# Patient Record
Sex: MALE | Race: WHITE | NOT HISPANIC OR LATINO | URBAN - METROPOLITAN AREA
[De-identification: names, ages, dates, MRNs, and addresses within clinical notes are randomized per-mention and may not be internally consistent; named-entity substitution may affect disease eponyms.]

---

## 2017-09-08 ENCOUNTER — OFFICE VISIT - HEALTHEAST (OUTPATIENT)
Dept: INTERNAL MEDICINE | Facility: CLINIC | Age: 72
End: 2017-09-08

## 2017-09-08 DIAGNOSIS — N40.1 BENIGN PROSTATIC HYPERPLASIA WITH LOWER URINARY TRACT SYMPTOMS, UNSPECIFIED MORPHOLOGY: ICD-10-CM

## 2017-09-08 DIAGNOSIS — I10 ESSENTIAL HYPERTENSION WITH GOAL BLOOD PRESSURE LESS THAN 140/90: ICD-10-CM

## 2017-09-08 DIAGNOSIS — Z00.00 ROUTINE GENERAL MEDICAL EXAMINATION AT A HEALTH CARE FACILITY: ICD-10-CM

## 2017-09-08 LAB
CHOLEST SERPL-MCNC: 152 MG/DL
FASTING STATUS PATIENT QL REPORTED: NO
HDLC SERPL-MCNC: 48 MG/DL
LDLC SERPL CALC-MCNC: 94 MG/DL
PSA SERPL-MCNC: 3.8 NG/ML (ref 0–6.5)
TRIGL SERPL-MCNC: 48 MG/DL

## 2017-09-08 ASSESSMENT — MIFFLIN-ST. JEOR: SCORE: 1444.3

## 2017-09-11 ENCOUNTER — COMMUNICATION - HEALTHEAST (OUTPATIENT)
Dept: INTERNAL MEDICINE | Facility: CLINIC | Age: 72
End: 2017-09-11

## 2018-08-13 ENCOUNTER — OFFICE VISIT - HEALTHEAST (OUTPATIENT)
Dept: INTERNAL MEDICINE | Facility: CLINIC | Age: 73
End: 2018-08-13

## 2018-08-13 ENCOUNTER — COMMUNICATION - HEALTHEAST (OUTPATIENT)
Dept: INTERNAL MEDICINE | Facility: CLINIC | Age: 73
End: 2018-08-13

## 2018-08-13 DIAGNOSIS — E78.00 PURE HYPERCHOLESTEROLEMIA: ICD-10-CM

## 2018-08-13 DIAGNOSIS — I10 ESSENTIAL HYPERTENSION WITH GOAL BLOOD PRESSURE LESS THAN 140/90: ICD-10-CM

## 2018-08-13 DIAGNOSIS — Z00.00 PREVENTATIVE HEALTH CARE: ICD-10-CM

## 2018-08-13 LAB
ALBUMIN SERPL-MCNC: 4.1 G/DL (ref 3.5–5)
ALP SERPL-CCNC: 38 U/L (ref 45–120)
ALT SERPL W P-5'-P-CCNC: 23 U/L (ref 0–45)
ANION GAP SERPL CALCULATED.3IONS-SCNC: 13 MMOL/L (ref 5–18)
AST SERPL W P-5'-P-CCNC: 23 U/L (ref 0–40)
BILIRUB SERPL-MCNC: 0.6 MG/DL (ref 0–1)
BUN SERPL-MCNC: 19 MG/DL (ref 8–28)
CALCIUM SERPL-MCNC: 9.5 MG/DL (ref 8.5–10.5)
CHLORIDE BLD-SCNC: 104 MMOL/L (ref 98–107)
CHOLEST SERPL-MCNC: 156 MG/DL
CO2 SERPL-SCNC: 24 MMOL/L (ref 22–31)
CREAT SERPL-MCNC: 0.84 MG/DL (ref 0.7–1.3)
FASTING STATUS PATIENT QL REPORTED: NO
GFR SERPL CREATININE-BSD FRML MDRD: >60 ML/MIN/1.73M2
GLUCOSE BLD-MCNC: 101 MG/DL (ref 70–125)
HDLC SERPL-MCNC: 53 MG/DL
LDLC SERPL CALC-MCNC: 90 MG/DL
POTASSIUM BLD-SCNC: 4.7 MMOL/L (ref 3.5–5)
PROT SERPL-MCNC: 6.5 G/DL (ref 6–8)
PSA SERPL-MCNC: 3.1 NG/ML (ref 0–6.5)
SODIUM SERPL-SCNC: 141 MMOL/L (ref 136–145)
TRIGL SERPL-MCNC: 65 MG/DL

## 2018-08-13 RX ORDER — TAMSULOSIN HYDROCHLORIDE 0.4 MG/1
0.4 CAPSULE ORAL DAILY
Qty: 90 CAPSULE | Refills: 3 | Status: SHIPPED | OUTPATIENT
Start: 2018-08-13

## 2018-08-13 RX ORDER — LISINOPRIL 10 MG/1
10 TABLET ORAL DAILY
Qty: 90 TABLET | Refills: 3 | Status: SHIPPED | OUTPATIENT
Start: 2018-08-13

## 2018-08-13 RX ORDER — SIMVASTATIN 20 MG
20 TABLET ORAL AT BEDTIME
Qty: 90 TABLET | Refills: 3 | Status: SHIPPED | OUTPATIENT
Start: 2018-08-13

## 2021-05-27 ENCOUNTER — RECORDS - HEALTHEAST (OUTPATIENT)
Dept: ADMINISTRATIVE | Facility: CLINIC | Age: 76
End: 2021-05-27

## 2021-05-31 VITALS — HEIGHT: 70 IN | WEIGHT: 157.2 LBS | BODY MASS INDEX: 22.5 KG/M2

## 2021-06-01 VITALS — BODY MASS INDEX: 23.29 KG/M2 | WEIGHT: 162.3 LBS

## 2021-06-12 NOTE — PROGRESS NOTES
Assessment and Plan:   ASSESSMENT:  1. Routine general medical examination at a health care facility  Very healthy man.  Reviewed all health maintenance.  He wishes to refuse further influenza vaccines as he became ill last year  - PSA (Prostatic-Specific Antigen), Annual Screen  - Vitamin D, Total (25-Hydroxy)    2. Essential hypertension with goal blood pressure less than 140/90  Stable.  No cough  - Lipid Cascade  - Comprehensive Metabolic Panel  - lisinopril (PRINIVIL,ZESTRIL) 10 MG tablet; Take 1 tablet (10 mg total) by mouth daily.  Dispense: 90 tablet; Refill: 3    3. Benign prostatic hyperplasia with lower urinary tract symptoms, unspecified morphology  Stable  - tamsulosin (FLOMAX) 0.4 mg Cp24; Take 1 capsule (0.4 mg total) by mouth daily.  Dispense: 90 capsule; Refill: 3      4.  Hyperlipidemia.  Simvastatin added last year without side effect.  Recheck labs    PLAN:  Patient Instructions   Decrease colonoscopy to every 10 years--2013 to 2023    Refill meds    Update blood tests    No flu shot          Orders Placed This Encounter   Procedures     PSA (Prostatic-Specific Antigen), Annual Screen     Lipid Cascade     Order Specific Question:   Fasting is required?     Answer:   Unknown     Comprehensive Metabolic Panel     Vitamin D, Total (25-Hydroxy)     Medications Discontinued During This Encounter   Medication Reason     lisinopril (PRINIVIL,ZESTRIL) 10 MG tablet Reorder     simvastatin (ZOCOR) 20 MG tablet Reorder     tamsulosin (FLOMAX) 0.4 mg Cp24 Reorder       No Follow-up on file.    The patient's current medical problems were reviewed.      The following health maintenance schedule was reviewed with the patient and provided in printed form in the after visit summary:   Health Maintenance   Topic Date Due     FALL RISK ASSESSMENT  10/16/2016     TD 18+ HE  12/20/2016     INFLUENZA VACCINE RULE BASED (1) 08/01/2017     ADVANCE DIRECTIVES DISCUSSED WITH PATIENT  09/14/2021     COLONOSCOPY   10/17/2023     PNEUMOCOCCAL POLYSACCHARIDE VACCINE AGE 65 AND OVER  Completed     PNEUMOCOCCAL CONJUGATE VACCINE FOR ADULTS (PCV13 OR PREVNAR)  Completed     ZOSTER VACCINE  Completed        Subjective:   Chief Complaint:   Chief Complaint   Patient presents with     Medicare Annual Wellness Visit Subsequent       HPI:    Hubert Samayoa is an 71 y.o. male here for an Annual Wellness visit. He is a former Dr. Pope patient. He does not have any acute concerns today.     Health Maintenance: He does not want a flu shot today. He was given a five year clearance at his 2013 colonoscopy. It was normal, but his father had colon cancer. He has never had a polyp from what he can remember.     Review of Systems:    He feels well in general. He was started on 20 mg of simvastatin daily last year, but he has not had his cholesterol checked since then. He has not had any side effects from the medication. He denies cough from lisinopril. Tamsulosin works well for him. He inquires if he should take a nitrous oxide pill. He takes a daily baby aspirin. He takes fish oil, vitamin C, and a multivitamin. His back occasionally bothers him, but he does not have much pain otherwise. He does not have knee problems from being a runner. He had to wear a holter monitor once after noticing a palpitation, but nothing was found. He denies swelling in the lower extremities.   Please see above.  The rest of the review of systems are negative for all systems.    PFSH  He likes to play golf and softball. He walks a lot, hunts, and fishes. He used to run marathons. He has never had surgery other than a tonsillectomy as a kid. He does not smoke.     Patient Care Team:  Nirav Pope MD as PCP - General (Internal Medicine)     Patient Active Problem List   Diagnosis     Benign Prostatic Hypertrophy With Urinary Obstruction     Serology Prostate-specific Antigen (PSA) Elevated     Hyperlipidemia     Essential Hypertension     No past medical history  "on file.   No past surgical history on file.   Family History   Problem Relation Age of Onset     Heart failure Mother      Dementia Father      Alzheimer's      Social History     Social History     Marital status: Single     Spouse name: N/A     Number of children: N/A     Years of education: N/A     Occupational History     Not on file.     Social History Main Topics     Smoking status: Never Smoker     Smokeless tobacco: Not on file     Alcohol use Not on file     Drug use: Not on file     Sexual activity: Not on file     Other Topics Concern     Not on file     Social History Narrative      Current Outpatient Prescriptions   Medication Sig Dispense Refill     dorzolamide (TRUSOPT) 2 % ophthalmic solution Administer 1 drop to both eyes 2 times a day at 6:00 am and 4:00 pm.       lisinopril (PRINIVIL,ZESTRIL) 10 MG tablet Take 1 tablet (10 mg total) by mouth daily. 90 tablet 3     simvastatin (ZOCOR) 20 MG tablet Take 1 tablet (20 mg total) by mouth at bedtime. 90 tablet 3     tamsulosin (FLOMAX) 0.4 mg Cp24 Take 1 capsule (0.4 mg total) by mouth daily. 90 capsule 3     aspirin 81 MG EC tablet Take 1 tablet (81 mg total) by mouth daily.  0     No current facility-administered medications for this visit.       Objective:   Vital Signs:   Visit Vitals     /70 (Patient Site: Left Arm, Patient Position: Sitting, Cuff Size: Adult Regular)     Pulse 81     Ht 5' 10\" (1.778 m)     Wt 157 lb 3.2 oz (71.3 kg)     BMI 22.56 kg/m2        VisionScreening:  No exam data present     PHYSICAL EXAM  Constitutional:  Reveals an alert, pleasant, talkative man.  Vitals:  Per nursing notes.  Ears:  Clear.  Oropharynx:  Without posterior nasal drainage or thrush.  Neck:  Supple, thyroid not palpable.  Cardiac:  Regular rate and rhythm without murmurs, rubs, or gallops. Legs without edema. Palpation of the radial pulse regular.  Lungs: Clear.  Respiratory effort normal.  Abdomen:   Bowel sounds positive, nontender, nondistended. "  Neither liver nor spleen palpable.  Skin:   Without rash, bruise, or palpable lesions.  Rheumatologic: Normal joints and nails of the hands.  Neurologic:  Cranial nerves II-XII intact.     Psychiatric:  Mood appropriate, memory intact.     Assessment Results 9/8/2017   Activities of Daily Living No help needed   Instrumental Activities of Daily Living No help needed   Get Up and Go Score Less than 12 seconds   Mini Cog Total Score 4   Some recent data might be hidden     A Mini-Cog score of 0-2 suggests the possibility of dementia, score of 3-5 suggests no dementia    Identified Health Risks:     ADDITIONAL HISTORY SUMMARIZED (2): Reviewed 9/14/2017 Dr. Tristan note regarding general health and medications. Reviewed 10/17/2017 colonoscopy report regarding clearance.   DECISION TO OBTAIN EXTRA INFORMATION (1): None.   RADIOLOGY TESTS (1): None.  LABS (1): Labs from 9/14/2017 reviewed. Labs ordered.   MEDICINE TESTS (1): None.  INDEPENDENT REVIEW (2 each): None.     The visit lasted a total of 15 minutes face to face with the patient. Over 50% of the time was spent counseling and educating the patient about general health maintenance.    IFuentes, am scribing for and in the presence of, Dr. Hurtado.    IDr. Hurtado, personally performed the services described in this documentation, as scribed by Fuentes Sewell in my presence, and it is both accurate and complete.    Dragon dictation was used for this note.  Speech recognition errors are a possibility.    Total data points: 3

## 2021-06-19 NOTE — PROGRESS NOTES
Assessment and Plan:       1. Preventative health care  : Up-to-date.  Does not wish shingles shot or any future flu shots  - PSA (Prostatic-Specific Antigen), Annual Screen    2. Essential hypertension with goal blood pressure less than 140/90  Stable.  Refill meds.  Update labs  - lisinopril (PRINIVIL,ZESTRIL) 10 MG tablet; Take 1 tablet (10 mg total) by mouth daily.  Dispense: 90 tablet; Refill: 3  - Comprehensive Metabolic Panel    3. Pure hypercholesterolemia  Stable.  Refill meds.  Update labs  - Lipid Cascade  - Comprehensive Metabolic Panel        The patient's current medical problems were reviewed.      The following health maintenance schedule was reviewed with the patient and provided in printed form in the after visit summary:   Health Maintenance   Topic Date Due     TD 18+ HE  12/20/2016     INFLUENZA VACCINE RULE BASED (1) 08/01/2018     FALL RISK ASSESSMENT  08/13/2019     ADVANCE DIRECTIVES DISCUSSED WITH PATIENT  09/14/2021     COLONOSCOPY  10/17/2023     PNEUMOCOCCAL POLYSACCHARIDE VACCINE AGE 65 AND OVER  Completed     PNEUMOCOCCAL CONJUGATE VACCINE FOR ADULTS (PCV13 OR PREVNAR)  Completed     ZOSTER VACCINE  Completed        Subjective:   Chief Complaint: Hubert Samayoa is an 72 y.o. male here for an Annual Wellness visit.   HPI:      Hypertension: He continues on lisinopril 10mg daily. He denies cough. He monitors his blood pressure rarely and says it is always controlled. He denies foot swelling.     Hyperlipidemia: He continues on simvastatin 20mg daily. His most recent lipid panel showed a total cholesterol of 152 on 9/8/17. He takes a daily baby aspirin as well.     BPH: He continues on tamsulosin 0.4mg daily.     Health Maintenance: He declines a flu shot. He was advised on Shingrix. He is up to date on colonoscopy.     Review of Systems:    He walks 3 miles per day for exercise.   He says his sleep is mostly good. He takes melatonin.   He treats occasional heart burn with Zantac or  TUMS.   His hearing is good.   He denies bowel issues.   He denies arthritis aches; he has occasional back pain.   Please see above.  The rest of the review of systems are negative for all systems.    He has a girlfriend. He does not have any children. 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     Patient Care Team:  Adrian Hurtado MD as PCP - General (Internal Medicine)     Patient Active Problem List   Diagnosis     Benign Prostatic Hypertrophy With Urinary Obstruction     Serology Prostate-specific Antigen (PSA) Elevated     Pure hypercholesterolemia     Essential Hypertension     No past medical history on file.   No past surgical history on file.   Family History   Problem Relation Age of Onset     Heart failure Mother      Dementia Father      Alzheimer's      Social History     Social History     Marital status: Single     Spouse name: N/A     Number of children: N/A     Years of education: N/A     Occupational History     Not on file.     Social History Main Topics     Smoking status: Never Smoker     Smokeless tobacco: Never Used     Alcohol use Not on file     Drug use: Not on file     Sexual activity: Not on file     Other Topics Concern     Not on file     Social History Narrative      Current Outpatient Prescriptions   Medication Sig Dispense Refill     aspirin 81 MG EC tablet Take 1 tablet (81 mg total) by mouth daily.  0     dorzolamide (TRUSOPT) 2 % ophthalmic solution Administer 1 drop to both eyes 2 times a day at 6:00 am and 4:00 pm.       lisinopril (PRINIVIL,ZESTRIL) 10 MG tablet Take 1 tablet (10 mg total) by mouth daily. 90 tablet 3     simvastatin (ZOCOR) 20 MG tablet Take 1 tablet (20 mg total) by mouth at bedtime. 90 tablet 3     tamsulosin (FLOMAX) 0.4 mg cap Take 1 capsule (0.4 mg total) by mouth daily. 90 capsule 3     No current facility-administered medications for this visit.       Objective:   Vital Signs:   Visit Vitals     /70 (Patient Site: Left Arm,  Patient Position: Sitting, Cuff Size: Adult Regular)     Pulse 77     Wt 162 lb 4.8 oz (73.6 kg)     SpO2 98%     BMI 23.29 kg/m2        VisionScreening:  No exam data present     PHYSICAL EXAM  Constitutional:  Reveals an alert, well-appearing older male.  Vitals:  Per nursing notes.  Ears:  Clear.  Oropharynx:  Without posterior nasal drainage or thrush.  Neck:  Supple, thyroid not palpable.  Cardiac:  Regular rate and rhythm without murmurs, rubs, or gallops. Legs without edema. Palpation of the radial pulse regular.  Lungs: Clear.  Respiratory effort normal.  Abdomen:   Bowel sounds positive, nontender, nondistended.  Neither liver nor spleen palpable.  Skin:   Without rash, bruise, or palpable lesions.  Rheumatologic: Normal joints and nails of the hands.  Neurologic:  Cranial nerves II-XII intact.     Psychiatric:  Mood appropriate, memory intact.     ADDITIONAL HISTORY SUMMARIZED (2): None.  DECISION TO OBTAIN EXTRA INFORMATION (1): None.   RADIOLOGY TESTS (1): None.  LABS (1): Reviewed past labs of 9/8/17 including vitamin D.   MEDICINE TESTS (1): None.  INDEPENDENT REVIEW (2 each): None.     The visit lasted a total of 16 minutes face to face with the patient. Over 50% of the time was spent counseling and educating the patient about health maintenance.    IJames, am scribing for and in the presence of, Dr. Hurtado.    I, Dr. Hurtado, personally performed the services described in this documentation, as scribed by James Bolton in my presence, and it is both accurate and complete.      Assessment Results 8/13/2018   Activities of Daily Living No help needed   Instrumental Activities of Daily Living No help needed   Get Up and Go Score Less than 12 seconds   Mini Cog Total Score 5   Some recent data might be hidden     A Mini-Cog score of 0-2 suggests the possibility of dementia, score of 3-5 suggests no dementia    Identified Health Risks:     The patient reports that he does not have all  recommended working emergency equipment available. He was provided with information about emergency preparedness, including smoke detectors.  Information regarding advance directives ([email or web address]), including where he can download the appropriate form, was provided to the patient via the AVS.